# Patient Record
Sex: MALE | Race: WHITE
[De-identification: names, ages, dates, MRNs, and addresses within clinical notes are randomized per-mention and may not be internally consistent; named-entity substitution may affect disease eponyms.]

---

## 2020-08-31 ENCOUNTER — HOSPITAL ENCOUNTER (EMERGENCY)
Dept: HOSPITAL 63 - ER | Age: 50
Discharge: ANOTHER HOSPITAL | End: 2020-08-31
Payer: SELF-PAY

## 2020-08-31 VITALS — SYSTOLIC BLOOD PRESSURE: 175 MMHG | DIASTOLIC BLOOD PRESSURE: 102 MMHG

## 2020-08-31 VITALS — WEIGHT: 217.16 LBS | HEIGHT: 66 IN | BODY MASS INDEX: 34.9 KG/M2

## 2020-08-31 DIAGNOSIS — Z88.2: ICD-10-CM

## 2020-08-31 DIAGNOSIS — Z87.442: ICD-10-CM

## 2020-08-31 DIAGNOSIS — R29.810: ICD-10-CM

## 2020-08-31 DIAGNOSIS — I16.0: Primary | ICD-10-CM

## 2020-08-31 DIAGNOSIS — Z88.0: ICD-10-CM

## 2020-08-31 DIAGNOSIS — F15.20: ICD-10-CM

## 2020-08-31 DIAGNOSIS — Z88.6: ICD-10-CM

## 2020-08-31 DIAGNOSIS — F17.210: ICD-10-CM

## 2020-08-31 DIAGNOSIS — I25.2: ICD-10-CM

## 2020-08-31 DIAGNOSIS — H53.2: ICD-10-CM

## 2020-08-31 LAB
ALBUMIN SERPL-MCNC: 3.4 G/DL (ref 3.4–5)
ALBUMIN/GLOB SERPL: 0.8 {RATIO} (ref 1–1.7)
ALP SERPL-CCNC: 124 U/L (ref 46–116)
ALT SERPL-CCNC: 53 U/L (ref 16–63)
AMPHETAMINE/METHAMPHETAMINE: (no result)
ANION GAP SERPL CALC-SCNC: 8 MMOL/L (ref 6–14)
APTT PPP: YELLOW S
AST SERPL-CCNC: 39 U/L (ref 15–37)
BACTERIA #/AREA URNS HPF: 0 /HPF
BARBITURATES UR-MCNC: (no result) UG/ML
BASOPHILS # BLD AUTO: 0.1 X10^3/UL (ref 0–0.2)
BASOPHILS NFR BLD: 1 % (ref 0–3)
BENZODIAZ UR-MCNC: (no result) UG/L
BILIRUB SERPL-MCNC: 0.3 MG/DL (ref 0.2–1)
BILIRUB UR QL STRIP: (no result)
BUN/CREAT SERPL: 15 (ref 6–20)
CA-I SERPL ISE-MCNC: 17 MG/DL (ref 8–26)
CALCIUM SERPL-MCNC: 8.6 MG/DL (ref 8.5–10.1)
CANNABINOIDS UR-MCNC: (no result) UG/L
CHLORIDE SERPL-SCNC: 104 MMOL/L (ref 98–107)
CO2 SERPL-SCNC: 26 MMOL/L (ref 21–32)
COCAINE UR-MCNC: (no result) NG/ML
CREAT SERPL-MCNC: 1.1 MG/DL (ref 0.7–1.3)
EOSINOPHIL NFR BLD: 0.2 X10^3/UL (ref 0–0.7)
EOSINOPHIL NFR BLD: 3 % (ref 0–3)
ERYTHROCYTE [DISTWIDTH] IN BLOOD BY AUTOMATED COUNT: 13.8 % (ref 11.5–14.5)
FIBRINOGEN PPP-MCNC: CLEAR MG/DL
GFR SERPLBLD BASED ON 1.73 SQ M-ARVRAT: 70.6 ML/MIN
GLOBULIN SER-MCNC: 4.2 G/DL (ref 2.2–3.8)
GLUCOSE SERPL-MCNC: 128 MG/DL (ref 70–99)
GLUCOSE UR STRIP-MCNC: (no result) MG/DL
HCT VFR BLD CALC: 50.4 % (ref 39–53)
HGB BLD-MCNC: 17.4 G/DL (ref 13–17.5)
LYMPHOCYTES # BLD: 1.8 X10^3/UL (ref 1–4.8)
LYMPHOCYTES NFR BLD AUTO: 21 % (ref 24–48)
MAGNESIUM SERPL-MCNC: 2.2 MG/DL (ref 1.8–2.4)
MCH RBC QN AUTO: 31 PG (ref 25–35)
MCHC RBC AUTO-ENTMCNC: 35 G/DL (ref 31–37)
MCV RBC AUTO: 90 FL (ref 79–100)
METHADONE SERPL-MCNC: (no result) NG/ML
MONO #: 0.8 X10^3/UL (ref 0–1.1)
MONOCYTES NFR BLD: 9 % (ref 0–9)
NEUT #: 5.6 X10^3UL (ref 1.8–7.7)
NEUTROPHILS NFR BLD AUTO: 67 % (ref 31–73)
NITRITE UR QL STRIP: (no result)
OPIATES UR-MCNC: (no result) NG/ML
PCP SERPL-MCNC: (no result) MG/DL
PLATELET # BLD AUTO: 216 X10^3/UL (ref 140–400)
POTASSIUM SERPL-SCNC: 4.2 MMOL/L (ref 3.5–5.1)
PROT SERPL-MCNC: 7.6 G/DL (ref 6.4–8.2)
RBC # BLD AUTO: 5.6 X10^6/UL (ref 4.3–5.7)
RBC #/AREA URNS HPF: (no result) /HPF (ref 0–2)
SODIUM SERPL-SCNC: 138 MMOL/L (ref 136–145)
SP GR UR STRIP: 1.02
SQUAMOUS #/AREA URNS LPF: (no result) /LPF
UROBILINOGEN UR-MCNC: 0.2 MG/DL
WBC # BLD AUTO: 8.5 X10^3/UL (ref 4–11)
WBC #/AREA URNS HPF: (no result) /HPF (ref 0–4)

## 2020-08-31 PROCEDURE — 84484 ASSAY OF TROPONIN QUANT: CPT

## 2020-08-31 PROCEDURE — 80053 COMPREHEN METABOLIC PANEL: CPT

## 2020-08-31 PROCEDURE — 85730 THROMBOPLASTIN TIME PARTIAL: CPT

## 2020-08-31 PROCEDURE — 81001 URINALYSIS AUTO W/SCOPE: CPT

## 2020-08-31 PROCEDURE — 96374 THER/PROPH/DIAG INJ IV PUSH: CPT

## 2020-08-31 PROCEDURE — 99285 EMERGENCY DEPT VISIT HI MDM: CPT

## 2020-08-31 PROCEDURE — 80307 DRUG TEST PRSMV CHEM ANLYZR: CPT

## 2020-08-31 PROCEDURE — 36415 COLL VENOUS BLD VENIPUNCTURE: CPT

## 2020-08-31 PROCEDURE — 83880 ASSAY OF NATRIURETIC PEPTIDE: CPT

## 2020-08-31 PROCEDURE — 93005 ELECTROCARDIOGRAM TRACING: CPT

## 2020-08-31 PROCEDURE — 85610 PROTHROMBIN TIME: CPT

## 2020-08-31 PROCEDURE — 85025 COMPLETE CBC W/AUTO DIFF WBC: CPT

## 2020-08-31 PROCEDURE — 70450 CT HEAD/BRAIN W/O DYE: CPT

## 2020-08-31 PROCEDURE — 83735 ASSAY OF MAGNESIUM: CPT
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
## 2020-08-31 NOTE — RAD
CT CODE STROKE HEAD WO

 

History:Slurred speech, altered mental status, hypertensive

 

Comparison: None.

 

Technique: Noncontrast CT imaging was performed of the head.  Exposure: 

One or more of the following individualized dose reduction techniques were

utilized for this examination:  1. Automated exposure control  2. 

Adjustment of the mA and/or kV according to patient size  3. Use of 

iterative reconstruction technique.

 

Findings: No acute intracranial hemorrhage is identified. Right transverse

and sigmoid sinuses are somewhat dense in appearance. There is no midline 

shift. There is an approximate 8 mm focus of low density of the genu of 

the left internal capsule extending to the anterior margin of the left 

thalamus. Ventricles are normal in size. The visualized paranasal sinuses 

and mastoid air cells are aerated.

 

Impression:

1. No acute intracranial hemorrhage is identified.

2. There is a focus of lower density at the margin of the left basal 

ganglia and thalamus which may be secondary to at least subacute infarct 

although chronicity more accurately evaluated by MRI as per clinical 

indication.

3. There is somewhat dense appearance of the venous sinuses, could be due 

to hemoconcentration" unless there is clinical suspicion for venous 

thrombosis.

 

Critical results were discussed with Dr. Gracia at 8/31/2020 9:19 AM.

 

 

 

Electronically signed by: Herb Watts MD (8/31/2020 9:21 AM) [id number]

## 2020-08-31 NOTE — EKG
[facility] [address], [place] [address]

Test Date:    2020               Test Time:    09:30:22

Pat Name:     PANKAJ HART                Department:   

Patient ID:   [id number]           Room:          

Gender:       M                        Technician:   

:          1970               Requested By: SHANKAR TOLEDO

Order Number: [id number]            Reading MD:     

                                 Measurements

Intervals                              Axis          

Rate:         81                       P:            24

NH:           174                      QRS:          -10

QRSD:         72                       T:            3

QT:           384                                    

QTc:          447                                    

                           Interpretive Statements

SINUS RHYTHM

LEFTWARD AXIS

QRS(T) CONTOUR ABNORMALITY

CONSISTENT WITH INFERIOR INFARCT

AGE UNDETERMINED

ABNORMAL ECG

RI6.02

No previous ECG available for comparison

## 2020-10-19 ENCOUNTER — HOSPITAL ENCOUNTER (INPATIENT)
Dept: HOSPITAL 63 - ER | Age: 50
LOS: 3 days | Discharge: HOME | DRG: 305 | End: 2020-10-22
Attending: INTERNAL MEDICINE | Admitting: HOSPITALIST
Payer: SELF-PAY

## 2020-10-19 VITALS — DIASTOLIC BLOOD PRESSURE: 98 MMHG | SYSTOLIC BLOOD PRESSURE: 140 MMHG

## 2020-10-19 VITALS — SYSTOLIC BLOOD PRESSURE: 132 MMHG | DIASTOLIC BLOOD PRESSURE: 99 MMHG

## 2020-10-19 VITALS — DIASTOLIC BLOOD PRESSURE: 104 MMHG | SYSTOLIC BLOOD PRESSURE: 146 MMHG

## 2020-10-19 VITALS — WEIGHT: 203.71 LBS | HEIGHT: 67 IN | BODY MASS INDEX: 31.97 KG/M2

## 2020-10-19 VITALS — DIASTOLIC BLOOD PRESSURE: 100 MMHG | SYSTOLIC BLOOD PRESSURE: 145 MMHG

## 2020-10-19 VITALS — SYSTOLIC BLOOD PRESSURE: 155 MMHG | DIASTOLIC BLOOD PRESSURE: 103 MMHG

## 2020-10-19 VITALS — DIASTOLIC BLOOD PRESSURE: 99 MMHG | SYSTOLIC BLOOD PRESSURE: 144 MMHG

## 2020-10-19 DIAGNOSIS — Z87.442: ICD-10-CM

## 2020-10-19 DIAGNOSIS — Z80.0: ICD-10-CM

## 2020-10-19 DIAGNOSIS — Z85.828: ICD-10-CM

## 2020-10-19 DIAGNOSIS — I25.10: ICD-10-CM

## 2020-10-19 DIAGNOSIS — R63.3: ICD-10-CM

## 2020-10-19 DIAGNOSIS — N20.0: ICD-10-CM

## 2020-10-19 DIAGNOSIS — I16.1: Primary | ICD-10-CM

## 2020-10-19 DIAGNOSIS — I25.2: ICD-10-CM

## 2020-10-19 DIAGNOSIS — R29.705: ICD-10-CM

## 2020-10-19 DIAGNOSIS — Z86.73: ICD-10-CM

## 2020-10-19 DIAGNOSIS — R29.810: ICD-10-CM

## 2020-10-19 DIAGNOSIS — I10: ICD-10-CM

## 2020-10-19 DIAGNOSIS — Z88.0: ICD-10-CM

## 2020-10-19 DIAGNOSIS — E11.9: ICD-10-CM

## 2020-10-19 DIAGNOSIS — Z82.49: ICD-10-CM

## 2020-10-19 DIAGNOSIS — G83.9: ICD-10-CM

## 2020-10-19 DIAGNOSIS — J44.9: ICD-10-CM

## 2020-10-19 DIAGNOSIS — F17.200: ICD-10-CM

## 2020-10-19 DIAGNOSIS — E78.5: ICD-10-CM

## 2020-10-19 DIAGNOSIS — Z91.14: ICD-10-CM

## 2020-10-19 DIAGNOSIS — K21.9: ICD-10-CM

## 2020-10-19 DIAGNOSIS — E78.00: ICD-10-CM

## 2020-10-19 LAB
ALBUMIN SERPL-MCNC: 3.3 G/DL (ref 3.4–5)
ALBUMIN/GLOB SERPL: 0.9 {RATIO} (ref 1–1.7)
ALP SERPL-CCNC: 137 U/L (ref 46–116)
ALT SERPL-CCNC: 132 U/L (ref 16–63)
AMPHETAMINE/METHAMPHETAMINE: (no result)
ANION GAP SERPL CALC-SCNC: 7 MMOL/L (ref 6–14)
APTT PPP: YELLOW S
AST SERPL-CCNC: 67 U/L (ref 15–37)
BACTERIA #/AREA URNS HPF: 0 /HPF
BARBITURATES UR-MCNC: (no result) UG/ML
BASOPHILS # BLD AUTO: 0.1 X10^3/UL (ref 0–0.2)
BASOPHILS NFR BLD: 1 % (ref 0–3)
BENZODIAZ UR-MCNC: (no result) UG/L
BILIRUB SERPL-MCNC: 0.3 MG/DL (ref 0.2–1)
BILIRUB UR QL STRIP: (no result)
BUN/CREAT SERPL: 15 (ref 6–20)
CA-I SERPL ISE-MCNC: 17 MG/DL (ref 8–26)
CALCIUM SERPL-MCNC: 9.4 MG/DL (ref 8.5–10.1)
CANNABINOIDS UR-MCNC: (no result) UG/L
CHLORIDE SERPL-SCNC: 104 MMOL/L (ref 98–107)
CO2 SERPL-SCNC: 27 MMOL/L (ref 21–32)
COCAINE UR-MCNC: (no result) NG/ML
CREAT SERPL-MCNC: 1.1 MG/DL (ref 0.7–1.3)
EOSINOPHIL NFR BLD: 0.2 X10^3/UL (ref 0–0.7)
EOSINOPHIL NFR BLD: 1 % (ref 0–3)
ERYTHROCYTE [DISTWIDTH] IN BLOOD BY AUTOMATED COUNT: 13.4 % (ref 11.5–14.5)
FIBRINOGEN PPP-MCNC: CLEAR MG/DL
GFR SERPLBLD BASED ON 1.73 SQ M-ARVRAT: 70.6 ML/MIN
GLOBULIN SER-MCNC: 3.8 G/DL (ref 2.2–3.8)
GLUCOSE SERPL-MCNC: 132 MG/DL (ref 70–99)
GLUCOSE UR STRIP-MCNC: (no result) MG/DL
HCT VFR BLD CALC: 46.4 % (ref 39–53)
HGB BLD-MCNC: 15.7 G/DL (ref 13–17.5)
LYMPHOCYTES # BLD: 2.7 X10^3/UL (ref 1–4.8)
LYMPHOCYTES NFR BLD AUTO: 24 % (ref 24–48)
MCH RBC QN AUTO: 30 PG (ref 25–35)
MCHC RBC AUTO-ENTMCNC: 34 G/DL (ref 31–37)
MCV RBC AUTO: 89 FL (ref 79–100)
METHADONE SERPL-MCNC: (no result) NG/ML
MONO #: 1.2 X10^3/UL (ref 0–1.1)
MONOCYTES NFR BLD: 10 % (ref 0–9)
NEUT #: 7.3 X10^3UL (ref 1.8–7.7)
NEUTROPHILS NFR BLD AUTO: 64 % (ref 31–73)
NITRITE UR QL STRIP: (no result)
OPIATES UR-MCNC: (no result) NG/ML
PCP SERPL-MCNC: (no result) MG/DL
PLATELET # BLD AUTO: 190 X10^3/UL (ref 140–400)
POTASSIUM SERPL-SCNC: 3.9 MMOL/L (ref 3.5–5.1)
PROT SERPL-MCNC: 7.1 G/DL (ref 6.4–8.2)
RBC # BLD AUTO: 5.21 X10^6/UL (ref 4.3–5.7)
RBC #/AREA URNS HPF: 0 /HPF (ref 0–2)
SODIUM SERPL-SCNC: 138 MMOL/L (ref 136–145)
SP GR UR STRIP: 1.02
SQUAMOUS #/AREA URNS LPF: (no result) /LPF
UROBILINOGEN UR-MCNC: 0.2 MG/DL
WBC # BLD AUTO: 11.4 X10^3/UL (ref 4–11)
WBC #/AREA URNS HPF: 0 /HPF (ref 0–4)

## 2020-10-19 PROCEDURE — 96365 THER/PROPH/DIAG IV INF INIT: CPT

## 2020-10-19 PROCEDURE — 93005 ELECTROCARDIOGRAM TRACING: CPT

## 2020-10-19 PROCEDURE — 71045 X-RAY EXAM CHEST 1 VIEW: CPT

## 2020-10-19 PROCEDURE — 90471 IMMUNIZATION ADMIN: CPT

## 2020-10-19 PROCEDURE — 74176 CT ABD & PELVIS W/O CONTRAST: CPT

## 2020-10-19 PROCEDURE — 82947 ASSAY GLUCOSE BLOOD QUANT: CPT

## 2020-10-19 PROCEDURE — 80061 LIPID PANEL: CPT

## 2020-10-19 PROCEDURE — 84443 ASSAY THYROID STIM HORMONE: CPT

## 2020-10-19 PROCEDURE — 76770 US EXAM ABDO BACK WALL COMP: CPT

## 2020-10-19 PROCEDURE — 70450 CT HEAD/BRAIN W/O DYE: CPT

## 2020-10-19 PROCEDURE — 96366 THER/PROPH/DIAG IV INF ADDON: CPT

## 2020-10-19 PROCEDURE — 80048 BASIC METABOLIC PNL TOTAL CA: CPT

## 2020-10-19 PROCEDURE — 36415 COLL VENOUS BLD VENIPUNCTURE: CPT

## 2020-10-19 PROCEDURE — 80053 COMPREHEN METABOLIC PANEL: CPT

## 2020-10-19 PROCEDURE — 81001 URINALYSIS AUTO W/SCOPE: CPT

## 2020-10-19 PROCEDURE — 80307 DRUG TEST PRSMV CHEM ANLYZR: CPT

## 2020-10-19 PROCEDURE — 84484 ASSAY OF TROPONIN QUANT: CPT

## 2020-10-19 PROCEDURE — 85025 COMPLETE CBC W/AUTO DIFF WBC: CPT

## 2020-10-19 RX ADMIN — MORPHINE SULFATE PRN MG: 2 INJECTION, SOLUTION INTRAMUSCULAR; INTRAVENOUS at 21:58

## 2020-10-19 NOTE — NUR
Pt's O2 desats into low 80's while asleep with periods of apnea noted. Pt roused and reports 
he has been told he has sleep apnea in the past, but does not use a CPAP at home. O2 applied 
at 2L via NC to keep sats >90%.

## 2020-10-19 NOTE — RAD
Exam: Chest one view

 

INDICATION: Altered mental status

 

TECHNIQUE: Frontal view of the chest

 

Comparisons: None

 

FINDINGS:

The cardiomediastinal silhouette and pulmonary vessels are within normal 

limits.

 

The lung and pleural spaces are clear.

 

IMPRESSION:

No acute cardiopulmonary process.

 

Electronically signed by: Kim Burton MD (10/19/2020 6:11 PM) DANIELLA

## 2020-10-19 NOTE — NUR
ADMISSION:



The patient, PANKAJ HART, 49 y/o, M admitted by CASSI REYES MD, was given written 
information regarding hospital policies, unit procedures and contact persons.  Pt arrived to 
ICU bed 5 via rney, accompanied by [facility] and nursing sup. Pt able to amb independently 
from gurney to toilet to void. Steady gait noted. Pt A/Ox4, slurred speech noted. Pt reports 
recent hx of CVA, discharged from [facility] on 9/5/20. Pt here for hypertensive emergency. BP 
212/129 upon arrival to ED, POV. Pt started on Cardene gtt with improvement, gtt currently 
on hold as BP is now 146/104. Telemetry placed, showing SR with rate in the low 90's. Pt c/o 
continued posterior HA and wishing to eat. Dr. Reyes notified of pt arrival and status. Home 
meds reviewed and continued. New order received to advance diet as tolerated and give 
morphine 2mg IVP prn q1hr for pain. Pt on Plavix and ASA for VTE. POC discussed, pt V/U. 
Call light in reach. 



Valuables were checked and logged. Left in room with patient.

## 2020-10-19 NOTE — PHYS DOC
Past History


Past Medical History:  CVA, Hypertension, Kidney Stones, MI, Sinusitis


Past Surgical History:  Other


Additional Past Surgical Histo:  SINUS SURGERY; STENT FOR KIDNEY STONE; KIDNEY 

STONES BLASTED


Smoking:  Cigarettes


Alcohol Use:  None


Drug Use:  Amphetamine





Adult General


Chief Complaint


Chief Complaint:  NEURO SYMPTOMS/DEFICITS





HPI


HPI





Patient is a 50-year-old male who presents for right sided facial droop/

paralysis.  This was first noticed yesterday morning greater than 24 hours ago 

without any known inciting event or trauma.  Patient reports inability to fully 

move right side of mouth and has had difficulties with eating and has slurred 

speech.  Problem persisted until he finally came to our ER today for evaluation.

 Patient has extensive past medical history significant for CAD, h

ypercholesterolemia, diabetes, hypertension, and prior left pontine stroke that 

was treated at [facility] 8/31/2020-9/5/2020.  He has been taking all medications 

daily and is currently on Plavix.  He has not been checking his blood pressure 

daily and unknown what his blood pressure averages whilst taking Cozaar.  

Patient admits continued cigarette use, denies any excessive drinking or other 

concerning risk factors at this time





On arrival, code stroke was initiated even though patient was outside window for

TPA with symptom onset greater than 24 hours ago.  NIH stroke scale was 

calculated at 5, please defer to nurse documentation for specifics regarding 

this





Review of Systems


Review of Systems


Fourteen body systems of review of systems have been reviewed. See HPI for 

pertinent positives and negative responses, other wise all other systems are 

negative, non-pertinent or non-contributory





Current Medications


Current Medications





Current Medications








 Medications


  (Trade)  Dose


 Ordered  Sig/Jeffery  Start Time


 Stop Time Status Last Admin


Dose Admin


 


 Nicardipine HCl


 50 mg/Sodium


 Chloride  270 ml @ 


 27 mls/hr  CONT  PRN  10/19/20 18:00


    10/19/20 18:17


27 MLS/HR











Allergies


Allergies





Allergies








Coded Allergies Type Severity Reaction Last Updated Verified


 


  Penicillins Allergy Unknown  8/31/20 Yes


 


  Sulfa (Sulfonamide Antibiotics) Allergy Unknown  8/31/20 Yes


 


  aspirin Allergy Unknown  8/31/20 Yes











Physical Exam


Physical Exam


Constitutional: Well developed, well nourished, unkept, poor hygiene, no acute 

distress, non-toxic appearance. 


HENT: Normocephalic, atraumatic, bilateral external ears normal, oropharynx 

moist, no oral exudates, nose normal. 


Eyes: PERRLA, EOMI, conjunctiva normal, no discharge.  


Neck: Normal range of motion, no tenderness, supple, no stridor.  


Cardiovascular: Heart rate regular, sinus rhythm, no murmurs rubs or gallops


Lungs & Thorax:  Bilateral breath sounds clear to auscultation 


Abdomen: Bowel sounds normal, soft, protuberant abdomen, no tenderness, no 

masses, no pulsatile masses.  Nonsurgical abdomen, no peritoneal signs


Skin: Warm, dry, no erythema, no rash.  


Back: No tenderness, no CVA tenderness.  


Extremities: No tenderness, no cyanosis, no clubbing, ROM intact, no edema.  


Neurologic: Alert and oriented X 3, unremarkable sensory function, impaired 

motor function to right lower portion of face with obvious facial droop and 

slurred speech, cranial nerves II through XII intact except mild decreased 

sensation of right cranial nerve V V2 and V3 dermatomes, no other obvious focal 

deficits noted. 


Psychologic: Affect normal, judgement normal, mood normal.





Current Patient Data


Vital Signs





                                   Vital Signs








  Date Time  Temp Pulse Resp B/P (MAP) Pulse Ox O2 Delivery O2 Flow Rate FiO2


 


10/19/20 17:43 98.4 96 18 212/129 (156) 94 Room Air  








Lab Results





                                Laboratory Tests








Test


 10/19/20


17:45 10/19/20


17:49


 


White Blood Count


 11.4 x10^3/uL


(4.0-11.0)  H 





 


Red Blood Count


 5.21 x10^6/uL


(4.30-5.70) 





 


Hemoglobin


 15.7 g/dL


(13.0-17.5) 





 


Hematocrit


 46.4 %


(39.0-53.0) 





 


Mean Corpuscular Volume


 89 fL ()


 





 


Mean Corpuscular Hemoglobin 30 pg (25-35)   


 


Mean Corpuscular Hemoglobin


Concent 34 g/dL


(31-37) 





 


Red Cell Distribution Width


 13.4 %


(11.5-14.5) 





 


Platelet Count


 190 x10^3/uL


(140-400) 





 


Neutrophils (%) (Auto) 64 % (31-73)   


 


Lymphocytes (%) (Auto) 24 % (24-48)   


 


Monocytes (%) (Auto) 10 % (0-9)  H 


 


Eosinophils (%) (Auto) 1 % (0-3)   


 


Basophils (%) (Auto) 1 % (0-3)   


 


Neutrophils # (Auto)


 7.3 x10^3uL


(1.8-7.7) 





 


Lymphocytes # (Auto)


 2.7 x10^3/uL


(1.0-4.8) 





 


Monocytes # (Auto)


 1.2 x10^3/uL


(0.0-1.1)  H 





 


Eosinophils # (Auto)


 0.2 x10^3/uL


(0.0-0.7) 





 


Basophils # (Auto)


 0.1 x10^3/uL


(0.0-0.2) 





 


Sodium Level


 138 mmol/L


(136-145) 





 


Potassium Level


 3.9 mmol/L


(3.5-5.1) 





 


Chloride Level


 104 mmol/L


() 





 


Carbon Dioxide Level


 27 mmol/L


(21-32) 





 


Anion Gap 7 (6-14)   


 


Blood Urea Nitrogen


 17 mg/dL


(8-26) 





 


Creatinine


 1.1 mg/dL


(0.7-1.3) 





 


Estimated GFR


(Cockcroft-Gault) 70.6  


 





 


BUN/Creatinine Ratio 15 (6-20)   


 


Glucose Level


 132 mg/dL


(70-99)  H 





 


Calcium Level


 9.4 mg/dL


(8.5-10.1) 





 


Total Bilirubin


 0.3 mg/dL


(0.2-1.0) 





 


Aspartate Amino Transferase


(AST) 67 U/L (15-37)


H 





 


Alanine Aminotransferase (ALT)


 132 U/L


(16-63)  H 





 


Alkaline Phosphatase


 137 U/L


()  H 





 


Total Protein


 7.1 g/dL


(6.4-8.2) 





 


Albumin


 3.3 g/dL


(3.4-5.0)  L 





 


Albumin/Globulin Ratio


 0.9 (1.0-1.7)


L 





 


Glucose (Fingerstick)


 


 131 mg/dL


(70-99)  H











EKG


EKG


EKG ordered and interpreted by off going physician Dr. Rain at 1750 and 

confirmed by myself at 1823 as sinus rhythm at 89 bpm, unremarkable intervals, 

left axis deviation, no acute ischemic findings, no STEMI





Radiology/Procedures


Radiology/Procedures





PROCEDURE: CT HEAD WO CONTRAST





Exam: CT head


 


INDICATION: Stroke


 


TECHNIQUE: Sequential axial images through the head were obtained without 


the administration of IV contrast.


 


Comparisons: 8/31/2020


 


FINDINGS:


No focal parenchymal lesion or hemorrhage is identified. There is no 


midline shift or sulcal effacement.


 


Stable lacunar infarct at the left basal ganglia. No acute vascular 


territory infarction is identified. Gray-white distinction is preserved.


 


The ventricular system is within normal limits without compression 


hydrocephalus. The basal cisterns are well maintained.


 


The visualized portions of the paranasal sinuses and mastoid air cells are


well-pneumatized. No acute fractures.


 


IMPRESSION:


No acute intracranial abnormality. No acute hemorrhage.


 


Exposure: One or more of the following in the visualized dose reduction 


techniques were utilized for this examination:


1.  Automated exposure control


2.  Adjustment of the MA and/or KV according to patient size


Use of iterative of reconstructive technique


 

















PROCEDURE: CHEST AP ONLY





Exam: Chest one view


 


INDICATION: Altered mental status


 


TECHNIQUE: Frontal view of the chest


 


Comparisons: None


 


FINDINGS:


The cardiomediastinal silhouette and pulmonary vessels are within normal 


limits.


 


The lung and pleural spaces are clear.


 


IMPRESSION:


No acute cardiopulmonary process.


 


Electronically signed by: Kim Burton MD (10/19/2020 6:11 PM) [facility]





Heart Score


HEART Score for Chest Pain:  








HEART Score for Chest Pain Response (Comments) Value


 


History Slighlty/Non-Suspicious 0


 


ECG Normal 0


 


Age >45 - < 65 1


 


Risk Factors >3 Risk Factors or Hx CAD 2


 


Troponin < Normal Limit 0


 


Total  3








Risk Factors:


Risk Factors:  DM, Current or recent (<one month) smoker, HTN, HLP, family 

history of CAD, obesity.


Risk Scores:


Risk Factors:  DM, Current or recent (<one month) smoker, HTN, HLP, family 

history of CAD, obesity.





Course & Med Decision Making


Course & Med Decision Making


Comprehensive signout obtained from off going physician who ordered preliminary 

work-up studies


I observed and assisted team in obtaining NIH stroke scale


Airway patent, breathing unremarkable, vitals concerning for marked hypertension

with obvious endorgan damage, IV access obtained


Comprehensive history and physical exam obtained with subsequent diagnostic 

work-up reviewed and added to as indicated


IV nicardipine administered with significant relief in patient's symptomology 

and subsequent blood pressure reading


Discussed with patient most likely diagnosis of hypertensive emergency, I 

discussed need for continued inpatient management of condition in ICU setting 

for which she was amenable


Dr. Reyes was contacted and case discussed, he was agreeable to admission to [facility] in ICU setting for continued blood pressure control and medical 

management of patient's condition.  He will continue on IV nicardipine ggt with 

sbp 140-160


Patient will likely have a case reviewed with neurologist tomorrow morning


All questions and concerns discussed with with patient prior to transport to [facility] for further medical intervention





Dragon Disclaimer


Dragon Disclaimer


This electronic medical record was generated, in whole or in part, using a voice

recognition dictation system.





Departure


Departure:


Impression:  


   Primary Impression:  


   Hypertensive emergency


Disposition:  09 ADMITTED AS INPT THIS HOSP ([facility])


Admitting Physician:  Vijay Reyes


Condition:  STABLE


Referrals:  


PCP,NO (PCP)











JAZMYN LOVE DO                 Oct 19, 2020 18:25

## 2020-10-19 NOTE — RAD
Exam: CT head

 

INDICATION: Stroke

 

TECHNIQUE: Sequential axial images through the head were obtained without 

the administration of IV contrast.

 

Comparisons: 8/31/2020

 

FINDINGS:

No focal parenchymal lesion or hemorrhage is identified. There is no 

midline shift or sulcal effacement.

 

Stable lacunar infarct at the left basal ganglia. No acute vascular 

territory infarction is identified. Gray-white distinction is preserved.

 

The ventricular system is within normal limits without compression 

hydrocephalus. The basal cisterns are well maintained.

 

The visualized portions of the paranasal sinuses and mastoid air cells are

well-pneumatized. No acute fractures.

 

IMPRESSION:

No acute intracranial abnormality. No acute hemorrhage.

 

Exposure: One or more of the following in the visualized dose reduction 

techniques were utilized for this examination:

1.  Automated exposure control

2.  Adjustment of the MA and/or KV according to patient size

Use of iterative of reconstructive technique

 

 

**********FOR INTERNAL CODING PURPOSES**********

 

Critical result:

 

Findings discussed with  Dr. Boyle at 10/19/2020 6:18 PM.

 

RESULT CODE: (C)  

 

 

 

 

 

Electronically signed by: Kim Burton MD (10/19/2020 6:25 PM) [facility]

## 2020-10-19 NOTE — EKG
[facility] [address], [place] [address]

Test Date:    2020-10-19               Test Time:    17:46:24

Pat Name:     PANKAJ HART                Department:   

Patient ID:   [id number]           Room:          

Gender:       M                        Technician:   AUNG

:          1970               Requested By: NICOLA WEST

Order Number: [id number]            Reading MD:     

                                 Measurements

Intervals                              Axis          

Rate:         89                       P:            42

IL:           160                      QRS:          -15

QRSD:         72                       T:            9

QT:           350                                    

QTc:          427                                    

                           Interpretive Statements

SINUS RHYTHM

LEFTWARD AXIS

QRS(T) CONTOUR ABNORMALITY

CONSISTENT WITH INFERIOR INFARCT

PROBABLY OLD

ABNORMAL ECG

RI6.02

No previous ECG available for comparison

## 2020-10-20 VITALS — SYSTOLIC BLOOD PRESSURE: 126 MMHG | DIASTOLIC BLOOD PRESSURE: 96 MMHG

## 2020-10-20 VITALS — SYSTOLIC BLOOD PRESSURE: 152 MMHG | DIASTOLIC BLOOD PRESSURE: 100 MMHG

## 2020-10-20 VITALS — DIASTOLIC BLOOD PRESSURE: 98 MMHG | SYSTOLIC BLOOD PRESSURE: 140 MMHG

## 2020-10-20 VITALS — SYSTOLIC BLOOD PRESSURE: 128 MMHG | DIASTOLIC BLOOD PRESSURE: 91 MMHG

## 2020-10-20 VITALS — SYSTOLIC BLOOD PRESSURE: 138 MMHG | DIASTOLIC BLOOD PRESSURE: 93 MMHG

## 2020-10-20 VITALS — SYSTOLIC BLOOD PRESSURE: 123 MMHG | DIASTOLIC BLOOD PRESSURE: 81 MMHG

## 2020-10-20 VITALS — SYSTOLIC BLOOD PRESSURE: 152 MMHG | DIASTOLIC BLOOD PRESSURE: 105 MMHG

## 2020-10-20 VITALS — SYSTOLIC BLOOD PRESSURE: 127 MMHG | DIASTOLIC BLOOD PRESSURE: 93 MMHG

## 2020-10-20 VITALS — SYSTOLIC BLOOD PRESSURE: 153 MMHG | DIASTOLIC BLOOD PRESSURE: 104 MMHG

## 2020-10-20 VITALS — DIASTOLIC BLOOD PRESSURE: 95 MMHG | SYSTOLIC BLOOD PRESSURE: 144 MMHG

## 2020-10-20 VITALS — SYSTOLIC BLOOD PRESSURE: 140 MMHG | DIASTOLIC BLOOD PRESSURE: 101 MMHG

## 2020-10-20 LAB
ANION GAP SERPL CALC-SCNC: 11 MMOL/L (ref 6–14)
BASOPHILS # BLD AUTO: 0.1 X10^3/UL (ref 0–0.2)
BASOPHILS NFR BLD: 1 % (ref 0–3)
CA-I SERPL ISE-MCNC: 17 MG/DL (ref 8–26)
CALCIUM SERPL-MCNC: 9.1 MG/DL (ref 8.5–10.1)
CHLORIDE SERPL-SCNC: 104 MMOL/L (ref 98–107)
CHOLEST/HDLC SERPL: 3 {RATIO}
CO2 SERPL-SCNC: 24 MMOL/L (ref 21–32)
CREAT SERPL-MCNC: 1 MG/DL (ref 0.7–1.3)
EOSINOPHIL NFR BLD: 0.2 X10^3/UL (ref 0–0.7)
EOSINOPHIL NFR BLD: 2 % (ref 0–3)
ERYTHROCYTE [DISTWIDTH] IN BLOOD BY AUTOMATED COUNT: 13.2 % (ref 11.5–14.5)
GFR SERPLBLD BASED ON 1.73 SQ M-ARVRAT: 78.8 ML/MIN
GLUCOSE SERPL-MCNC: 107 MG/DL (ref 70–99)
HCT VFR BLD CALC: 44.2 % (ref 39–53)
HDLC SERPL-MCNC: 39 MG/DL (ref 40–60)
HGB BLD-MCNC: 15.3 G/DL (ref 13–17.5)
LDLC: 64 MG/DL (ref 0–100)
LYMPHOCYTES # BLD: 1.8 X10^3/UL (ref 1–4.8)
LYMPHOCYTES NFR BLD AUTO: 19 % (ref 24–48)
MCH RBC QN AUTO: 31 PG (ref 25–35)
MCHC RBC AUTO-ENTMCNC: 35 G/DL (ref 31–37)
MCV RBC AUTO: 89 FL (ref 79–100)
MONO #: 0.8 X10^3/UL (ref 0–1.1)
MONOCYTES NFR BLD: 9 % (ref 0–9)
NEUT #: 6.6 X10^3UL (ref 1.8–7.7)
NEUTROPHILS NFR BLD AUTO: 70 % (ref 31–73)
PLATELET # BLD AUTO: 195 X10^3/UL (ref 140–400)
POTASSIUM SERPL-SCNC: 4.1 MMOL/L (ref 3.5–5.1)
RBC # BLD AUTO: 4.98 X10^6/UL (ref 4.3–5.7)
SODIUM SERPL-SCNC: 139 MMOL/L (ref 136–145)
TRIGL SERPL-MCNC: 71 MG/DL (ref 0–150)
VLDLC: 14 MG/DL (ref 0–40)
WBC # BLD AUTO: 9.4 X10^3/UL (ref 4–11)

## 2020-10-20 RX ADMIN — ASPIRIN SCH MG: 81 TABLET, COATED ORAL at 07:44

## 2020-10-20 RX ADMIN — VITAMIN D, TAB 1000IU (100/BT) SCH UNIT: 25 TAB at 07:43

## 2020-10-20 RX ADMIN — ACETAMINOPHEN PRN MG: 500 TABLET ORAL at 19:12

## 2020-10-20 RX ADMIN — LOSARTAN POTASSIUM SCH MG: 50 TABLET, FILM COATED ORAL at 07:44

## 2020-10-20 RX ADMIN — ATORVASTATIN CALCIUM SCH MG: 20 TABLET, FILM COATED ORAL at 07:43

## 2020-10-20 RX ADMIN — PANTOPRAZOLE SODIUM SCH MG: 40 TABLET, DELAYED RELEASE ORAL at 07:44

## 2020-10-20 RX ADMIN — FOLIC ACID SCH MG: 1 TABLET ORAL at 07:44

## 2020-10-20 RX ADMIN — CLOPIDOGREL BISULFATE SCH MG: 75 TABLET ORAL at 07:43

## 2020-10-20 RX ADMIN — MORPHINE SULFATE PRN MG: 2 INJECTION, SOLUTION INTRAMUSCULAR; INTRAVENOUS at 06:11

## 2020-10-20 RX ADMIN — METOPROLOL TARTRATE SCH MG: 50 TABLET, FILM COATED ORAL at 20:13

## 2020-10-20 NOTE — CONS
DATE OF CONSULTATION:  



NEUROLOGIC CONSULTATION



REFERRING PHYSICIAN:  Dr. Reyes.



REASON FOR CONSULTATION:  Rule out stroke versus TIA.



HISTORY OF PRESENT ILLNESS:  This is a 50-year-old right-handed  male

who is known to have recent pontine stroke on 08/31/2020, was treated at [facility] when he presented with right-sided weakness.  The patient stated

that in the last 2 days, he started having numbness and paresthesia over the

right side of his face along with slurred speech.  He also complains of global

headaches without nausea or vomiting.  The patient was evaluated in the

Emergency Room and found to have emergency hypertension with blood pressure of

212/129.  The patient was alert and oriented.  Initial nonenhanced head CT scan

revealed no evidence of acute intracranial process or bleed.  The patient was

started on intravenous nicardipine drip.  Currently, he denies chest pain,

shortness of breath or palpitation, but he complains of slight dysarthria and

global headaches.  The patient was able to drink and he denies any difficulty

swallowing.  Since yesterday, the patient continues to have some slurred speech.



PAST MEDICAL HISTORY:  Significant for hypertension, stroke as described above,

hyperlipidemia, diabetes mellitus, kidney stone, coronary artery disease, status

post myocardial infarction, sinusitis, GERD, basal cell carcinoma, skin cancer.



PAST SURGICAL HISTORY:  Positive for lithotripsy and stent placement.



FAMILY HISTORY:  Positive for hypertension.



SOCIAL HISTORY:  The patient has girlfriend.  He continues to smoke.  He denies

alcohol heavy excessive alcohol drinking or illicit drug use.



CURRENT MEDICATIONS:  Nicardipine drip for emergency hypertension, morphine

sulfate, metformin, aspirin 81 mg, Plavix 75 mg daily, pantoprazole 40 mg daily,

Lipitor 80 mg daily, losartan 100 mg daily, vitamin D3 4000 units, folic acid 1

mg daily and morphine 2 mg p.r.n.



ALLERGIES:  SULFA DRUGS AND PENICILLIN.



PHYSICAL EXAMINATION:

GENERAL:  Well-developed, well-nourished male, not in acute distress.  He weighs

92.4 kilos.

VITAL SIGNS:  Blood pressure 164/102, respiratory rate 14, pulse is 77 and

regular, oxygen saturation 97% on 2 liters by nasal cannula.

HEENT:  Normocephalic, atraumatic, otherwise unremarkable.

NECK:  Supple.  Negative for carotid bruit, lymphadenopathy or thyromegaly.

LUNGS:  Clear to A and P.

CARDIOVASCULAR:  Regular rate and rhythm, normal S1, S2.

ABDOMEN:  Soft.  Bowel sounds positive.  No palpable mass, organomegaly or

tenderness.

EXTREMITIES:  Negative for cyanosis, clubbing or edema.

NEUROLOGICAL:

MENTAL STATUS:  The patient is alert and oriented x 3.  The speech is fluent,

was slightly dysarthric.  There is no language dysfunction.  Memory, judgment,

and abstract thinking are normal.  The patient denies hallucination or delusion.

CRANIAL NERVES:  Visual fields are full.  The pupils are reactive to light and

accommodation.  The extraocular movements are intact.  There is no nystagmus. 

There is very mild right facial asymmetry.  Hearing is intact bilaterally.  The

palate is elevated symmetrically.  Sternocleidomastoid muscles are powerful

bilaterally.  The patient shrugs his shoulders symmetrically with ptosis. 

Tongue in the midline without fasciculation or atrophy.

MOTOR EXAMINATION:  No focal muscle bulk was seen.  The tone is normal.  The

strength is 4/5 throughout.

SENSORY EXAMINATION:  Revealed normal pinprick and light touch senses

throughout.  Deep tendon reflexes were symmetric and hypoactive with absent

Achilles responses.

GAIT:  Not tested.



LABORATORY DATA:  CBC revealed white blood cells of 9.4 thousand, hemoglobin

15.3, hematocrit 44.2, platelet count 195,000.  Chemistry revealed sodium 139,

potassium 4.1, chloride 104, CO2 of 24, BUN 17, creatinine 1, glucose 107,

calcium 9.1.  Urinalysis is negative for urinary tract infection.  Urinary drug

screen is negative.



IMPRESSION:

1.  Emergency hypertension.

2.  Recent history of a pontine stroke and currently with mild right facial

asymmetry with mild sensory deficits confined to the right face, probably due to

emergency hypertension; however, head CT scan may not disclose any acute stroke

in the first 40-72 hours from the onset.  However, the patient does not have any

lateralization signs.

3.  Multiple medical problems include hypertension, diabetes mellitus, coronary

artery disease, and status post myocardial infarction.



RECOMMENDATIONS:

1.  Continue with current management and correct hypertension to keep the

systolic blood pressure around 140/80.

2.  Physical Therapy evaluation.





______________________________

M SIMON CASTLE MD



DR:  CARTER/ruben  JOB#:  [id number] / [id number]

DD:  10/20/2020 08:42  DT:  10/20/2020 09:01

## 2020-10-20 NOTE — HP
ADMIT DATE:  10/19/2020



ATTENDING PHYSICIAN:  Dr. Bernstein.



CHIEF COMPLAINT:  Weakness and slurred speech.



HISTORY OF PRESENT ILLNESS:  The patient is a 50-year-old gentleman admitted

with new onset right sided facial droop, weakness and paralysis.  He has had

slurred speech.  He has noted this for 24 hours now.  He reports inability to

fully move the right side of the mouth and difficulty eating with slurred

speech.  He finally came to the ED for evaluation.  He has known coronary artery

disease, diabetes, hypertension and a previous left pontine stroke, treated at

[facility] from  through  of this year.  He is questionable on

his compliance.  He is currently on Plavix.  He has no medical coverage and

sometimes he has no medicines.  He states he has been taking his medication that

has been prescribed, although he has missed doses already.  His blood pressure

is markedly high with systolic greater than 200 mmHg.  He continues to smoke. 

He denies any alcohol use.  He was admitted then on a Cardene drip and treatment

of TIA symptoms.  He had a code stroke called.  He is outside the window for TPA

as symptoms are greater than 24 hours ago.  NIH stroke scale was 5.  He had the

obligatory CT of the head, which showed no new acute stroke identified, no new

acute hemorrhage.  He was admitted for further control of blood pressure,

further evaluation and urologic consultation.



PAST MEDICAL HISTORY:  Significant for labile hypertension, continued chronic

obstructive pulmonary disease and tobacco use, kidney stones, stents and sinus

surgeries.  He is disabled, currently waiting for disability.



CURRENT MEDICINES:  Reviewed.  He was scheduled to take aspirin daily, Lipitor,

Plavix, losartan, metformin, pantoprazole and vitamin D.



ALLERGIES:  He has allergies to PENICILLIN, SULFA AND ASPIRIN, exact reaction is

unclear.



FAMILY HISTORY:  His father had prostate and colon cancer.  Mother  of

complications of neurologic issues.  He could not give further details.  He

lives with a girlfriend.  He has no coverage.  He has no income at this time. 

He is awaiting disability.



REVIEW OF SYSTEMS:  Significant for the TIA symptoms, slurred speech.  He has

been noncompliant with his meds.  He does not have a primary care physician.  He

denied any nausea or vomiting.  No recent travel or COVID exposure.  All other

systems reviewed and determined to be negative.



PHYSICAL EXAMINATION:

GENERAL:  When I saw him, this is a pleasant, middle-aged gentleman.

VITAL SIGNS:  Initial vital signs showed a blood pressure of 200 mmHg, by the

time I saw him, it was down to between 140 and 160 mmHg systolic.  He was

afebrile, oxygen saturation 96% on room air.

HEENT:  Head is without trauma.  Pupils are reactive.  Sclerae nonicteric. 

There is a slight right-sided facial droop with asymmetry of the tongue.

NECK:  Supple, no bruits identified.

LUNGS:  Otherwise clear.

CARDIOVASCULAR:  Showed regular heart tones.  No gallops.

ABDOMEN:  Soft, no guarding, no organomegaly.  Bowel sounds are hypoactive.

NEUROLOGIC:   were symmetrical and intact in upper and lower extremities.

SKIN:  Warm and dry.



PERTINENT LABORATORY STUDIES:  CT of the head as noted.  Admission hemoglobin

was 15.7 g/dL with white count of 11,400.  Chemistry panel and electrolytes

within normal range.  Creatinine 1.0 mg percent, 3 sets of cardiac enzymes

negative for myocardial ischemia.



ASSESSMENT:

1.  A 50-year-old gentleman with transient ischemic attack symptoms.

2.  Labile hypertension.

3.  Noncompliance of meds.

4.  Old left pontine stroke just 2 months ago.

5.  Chronic obstructive pulmonary disease and tobacco addiction.

6.  Essential hypertension.

7.  Type 2 diabetes.



PLAN:

1.  Admit to the inpatient unit.

2.  Cardene drip has been initiated.

3.  Neurology consultation in the morning.

4.  Continue home meds.

5.  PT, OT and speech consult.





______________________________

CASSI BERNSTEIN MD



DR:  DEBBIE/ruben  JOB#:  [id number] / [id number]

DD:  10/20/2020 09:01  DT:  10/20/2020 09:37

## 2020-10-21 VITALS — SYSTOLIC BLOOD PRESSURE: 180 MMHG | DIASTOLIC BLOOD PRESSURE: 109 MMHG

## 2020-10-21 VITALS — SYSTOLIC BLOOD PRESSURE: 155 MMHG | DIASTOLIC BLOOD PRESSURE: 101 MMHG

## 2020-10-21 VITALS — SYSTOLIC BLOOD PRESSURE: 134 MMHG | DIASTOLIC BLOOD PRESSURE: 88 MMHG

## 2020-10-21 VITALS — DIASTOLIC BLOOD PRESSURE: 81 MMHG | SYSTOLIC BLOOD PRESSURE: 116 MMHG

## 2020-10-21 VITALS — SYSTOLIC BLOOD PRESSURE: 164 MMHG | DIASTOLIC BLOOD PRESSURE: 127 MMHG

## 2020-10-21 VITALS — SYSTOLIC BLOOD PRESSURE: 129 MMHG | DIASTOLIC BLOOD PRESSURE: 94 MMHG

## 2020-10-21 RX ADMIN — ACETAMINOPHEN PRN MG: 500 TABLET ORAL at 20:06

## 2020-10-21 RX ADMIN — METOPROLOL TARTRATE SCH MG: 50 TABLET, FILM COATED ORAL at 20:02

## 2020-10-21 RX ADMIN — ATORVASTATIN CALCIUM SCH MG: 20 TABLET, FILM COATED ORAL at 09:00

## 2020-10-21 RX ADMIN — VITAMIN D, TAB 1000IU (100/BT) SCH UNIT: 25 TAB at 09:00

## 2020-10-21 RX ADMIN — PANTOPRAZOLE SODIUM SCH MG: 40 TABLET, DELAYED RELEASE ORAL at 09:01

## 2020-10-21 RX ADMIN — FOLIC ACID SCH MG: 1 TABLET ORAL at 09:01

## 2020-10-21 RX ADMIN — METOPROLOL TARTRATE SCH MG: 50 TABLET, FILM COATED ORAL at 09:01

## 2020-10-21 RX ADMIN — MORPHINE SULFATE PRN MG: 2 INJECTION, SOLUTION INTRAMUSCULAR; INTRAVENOUS at 18:02

## 2020-10-21 RX ADMIN — ASPIRIN SCH MG: 81 TABLET, COATED ORAL at 09:00

## 2020-10-21 RX ADMIN — LOSARTAN POTASSIUM SCH MG: 50 TABLET, FILM COATED ORAL at 09:00

## 2020-10-21 RX ADMIN — CLOPIDOGREL BISULFATE SCH MG: 75 TABLET ORAL at 09:00

## 2020-10-21 NOTE — RAD
CT Abdomen and Pelvis without contrast  

 

History: Renal stones and hydronephrosis, hypertension

 

Technique: Noncontrast CT imaging was performed of the abdomen and pelvis.

Multiplanar images are reviewed.  Exposure: One or more of the following 

individualized dose reduction techniques were utilized for this 

examination:  1. Automated exposure control  2. Adjustment of the mA 

and/or kV according to patient size  3. Use of iterative reconstruction 

technique.

 

Comparison:  September 11, 2016

 

Findings: There is no hydronephrosis of either kidney. There is remaining 

large left renal calculus about 1.4 cm, previously about 0.6 cm. There are

no right renal calculi. Previously there was another left renal calculus 

which is no longer present. No ureteral calculus is identified on either 

side.

 

There is apparently new 0.3 to 0.4 cm right lower lobe pulmonary nodule 

image 15 series 2. Accurate evaluation of abdominal visceral organs is 

limited without intravenous contrast.  There is no obvious abnormality of 

the spleen, liver, or pancreas. Gallbladder is present without obvious 

intraluminal abnormality by CT.  There is no adrenal nodularity. Accurate 

evaluation of bowel is limited without oral contrast. There is no 

significant free air, free fluid, bowel dilatation. There is mild colonic 

diverticulosis, no adjacent inflammatory change. There is some fat in the 

inguinal canals bilaterally greater on the left without internal bowel, 

somewhat increased on the left in the interval. Normal caliber appendix is

visualized without adjacent inflammatory change. There are multiple small 

nonspecific retroperitoneal nodes not considered significantly enlarged, 

overall somewhat less prominent than previously. There are some prostate 

calcifications. There is mild reverse S-shaped curvature of the 

thoracolumbar spine. There is advanced degenerative disc disease L1-L2 and

to lesser degree at L3-4 and L4-5, multilevel lumbar spondylosis.

 

Impression: 

 

1. There is large left renal calculus, no hydronephrosis of either kidney.

2. There is a new small right lower lobe pulmonary nodule near the base. 

If increased risk factors for neoplasm, dedicated chest CT to completely 

evaluate the lung parenchyma may be indicated. Regarding the visualized 

nodule, optional 12 month follow-up could be performed if increased risk 

factors for neoplasm, otherwise no additional follow-up needed if low risk

factors.

 

Electronically signed by: Herb Watts MD (10/21/2020 3:57 PM) 

[facility]

## 2020-10-21 NOTE — PN
DATE:  10/21/2020



SUBJECTIVE:  The patient is a 50-year-old  male patient who was

admitted with hypertension.  He apparently has ischemic stroke before and has

left old pontine stroke about just 2 months ago.  Obviously, his blood pressure

is poorly controlled and extremely labile.



OBJECTIVE:

GENERAL:  When I saw him this afternoon, he was resting slightly propped up in

bed, in no apparent respiratory distress, pale, no jaundice, cyanosis or

thyromegaly.  No jugular venous distention.  No lower limb edema.

VITAL SIGNS:  His heart rate was 80, blood pressure was 155/101, temperature was

97.9, respiratory rate 20, and oxygen saturation was 98% on room air.

HEAD, EYES, EARS, NOSE AND THROAT:  Normocephalic, atraumatic.

NECK:  Supple.

HEART:  Showed normal first and second heart sounds with no gallop, rub or

murmur.

CHEST:  Clear to auscultation.  No crepitation or rhonchi.

ABDOMEN:  Distended, soft, nontender.  No guarding or rigidity.  No

organomegaly.  All hernial orifice intact.  Bowel sounds normal.

NEUROLOGIC:  He is awake, alert, responding appropriately.  All cranial nerves

are intact.  He moves extremities without difficulty.



LABORATORY DATA:  As of yesterday showed a serum sodium 139, potassium 4.1,

chloride 104, bicarbonate 24, anion gap of 11, BUN 17, creatinine 1, estimated

GFR was 78 mL per minute, his glucose 107 and calcium was 9.1.  His serum

triglycerides were 71, total cholesterol 117, LDL cholesterol was 64, VLDL was

14, HDL was 39, the ratio was 3.  His white cell count was 9400, hemoglobin 15,

hematocrit 44, MCV 89 and platelet count of 195,000.  His urinalysis was

essentially unremarkable and toxic screen was negative.



PLAN:  My plan is to arrange for him to have a CT scan of the abdomen and pelvis

without contrast as well as renal __ ultrasound as he has a history of

hydronephrosis requiring stenting and has history also of congestive heart

failure that may be related to also a renal artery stenosis.





______________________________

PAVEL ALLAN MD



DR:  ANNAMARIA/ruben  JOB#:  [id number] / [id number]

DD:  10/21/2020 14:51  DT:  10/21/2020 21:22

## 2020-10-21 NOTE — NUR
PT did well today. Did take a shower. Bp was high this am then ok till about 6pm. PT started 
to get a headache and bp was high again. Pt given PRN for headache. PT had a ct today then 
will have renal ultrasound tomorrow. 



 BGray APRN

## 2020-10-22 VITALS — SYSTOLIC BLOOD PRESSURE: 131 MMHG | DIASTOLIC BLOOD PRESSURE: 70 MMHG

## 2020-10-22 VITALS — DIASTOLIC BLOOD PRESSURE: 99 MMHG | SYSTOLIC BLOOD PRESSURE: 154 MMHG

## 2020-10-22 LAB
ANION GAP SERPL CALC-SCNC: 9 MMOL/L (ref 6–14)
CA-I SERPL ISE-MCNC: 21 MG/DL (ref 8–26)
CALCIUM SERPL-MCNC: 8.8 MG/DL (ref 8.5–10.1)
CHLORIDE SERPL-SCNC: 104 MMOL/L (ref 98–107)
CO2 SERPL-SCNC: 26 MMOL/L (ref 21–32)
CREAT SERPL-MCNC: 1 MG/DL (ref 0.7–1.3)
GFR SERPLBLD BASED ON 1.73 SQ M-ARVRAT: 78.8 ML/MIN
GLUCOSE SERPL-MCNC: 107 MG/DL (ref 70–99)
POTASSIUM SERPL-SCNC: 3.9 MMOL/L (ref 3.5–5.1)
SODIUM SERPL-SCNC: 139 MMOL/L (ref 136–145)

## 2020-10-22 RX ADMIN — PANTOPRAZOLE SODIUM SCH MG: 40 TABLET, DELAYED RELEASE ORAL at 08:32

## 2020-10-22 RX ADMIN — CLOPIDOGREL BISULFATE SCH MG: 75 TABLET ORAL at 08:32

## 2020-10-22 RX ADMIN — METOPROLOL TARTRATE SCH MG: 50 TABLET, FILM COATED ORAL at 08:33

## 2020-10-22 RX ADMIN — FOLIC ACID SCH MG: 1 TABLET ORAL at 08:34

## 2020-10-22 RX ADMIN — ASPIRIN SCH MG: 81 TABLET, COATED ORAL at 08:33

## 2020-10-22 RX ADMIN — ATORVASTATIN CALCIUM SCH MG: 20 TABLET, FILM COATED ORAL at 08:32

## 2020-10-22 RX ADMIN — VITAMIN D, TAB 1000IU (100/BT) SCH UNIT: 25 TAB at 08:32

## 2020-10-22 RX ADMIN — LOSARTAN POTASSIUM SCH MG: 50 TABLET, FILM COATED ORAL at 08:33

## 2020-10-22 NOTE — NUR
Pt has been NPO since midnight for renal us scheduled for this morning.  BP improved with 
addition of metoprolol.  Pt was yelling and using foul language at one point during the 
evening while talking on the phone, asked pt to keep his voice down and not use foul 
language while in the hospital, pt apologized and agreed.

## 2020-10-22 NOTE — DS
DATE OF DISCHARGE:  10/22/2020



HOSPITAL COURSE:  The patient is a 50-year-old  male patient who was

admitted with poorly-controlled hypertension.  He was on losartan.  His blood

pressure was extremely labile.  Metoprolol 50 mg twice a day was added and his

blood pressure remained stable.  I did actually order his renal artery duplex

and it showed that there is no evidence of hemodynamically significant renal

artery stenosis.  CT scan of the abdomen and pelvis without contrast showed that

the patient has a large left renal calculus.  No hydronephrosis of either

kidney.  There is a new small right lower lobe pulmonary nodule near the base. 

With increased risk factors for neoplasm, dedicated chest CT to completely

evaluate the lung parenchyma may be indicated and in fact, his blood pressure

remained stable and well controlled and therefore, a decision was made to

discharge him home.  The patient was given a list of primary care physician to

choose from and was given a prescription for metoprolol 50 mg twice a day.



PHYSICAL EXAMINATION:

GENERAL:  When I saw him this afternoon, he looked well and was clearly in no

apparent respiratory distress, somewhat pale, but no jaundice, cyanosis or

thyromegaly.  No jugular venous distention.  No limb edema.

VITAL SIGNS:  Heart rate was 93, blood pressure was 131/70, temperature 97.9,

respiratory rate was 18 and oxygen saturation was 95%.

HEAD, EYES, EARS, NOSE AND THROAT:  Showed normocephalic, atraumatic.

NECK:  Supple.

HEART:  Showed normal first and second heart sounds.  No gallop or murmur.

CHEST:  Clear to auscultation.  No crepitation or rhonchi.

ABDOMEN:  Distended, soft, nontender.

NEUROLOGIC:  He continued to have diplopia, but otherwise all his cranial nerves

are intact.

EXTREMITIES:  He moves extremities without difficulty.



LABORATORY DATA:  This morning showed serum sodium 139, potassium 3.9, chloride

104, bicarbonate 26, anion gap of 8, BUN 21, creatinine 1, estimated GFR was 79

mL per minute.  Her glucose 107, calcium was 8.8.  Serum triglycerides were 71,

total cholesterol 117, VLDL was 64, HDL cholesterol was 39, the ratio was 3. 

His TSH was 0.980.



DISCHARGE MEDICATIONS:  He was discharged home to continue on metoprolol

tartrate 50 mg twice a day, aspirin 81 mg once a day, atorvastatin calcium 80 mg

at bedtime, Plavix 75 mg once a day, losartan potassium 100 mg once a day,

metformin 500 mg twice a day with meals, Protonix 40 mg once a day, and vitamin

D3 with Flovite 1 tablet once a day.



FINAL DISCHARGE DIAGNOSES:  Poorly controlled hypertension, much improved. 

Other medical problems include chronic obstructive pulmonary disease, continued

tobacco use.  He has also hyperlipidemia, type 2 diabetes mellitus, and

nephrolithiasis.





______________________________

PAVEL ALLAN MD



DR:  ANNAMARIA/ruben  JOB#:  [id number] / [id number]

DD:  10/22/2020 14:57  DT:  10/22/2020 17:16

## 2020-10-22 NOTE — RAD
EXAMINATION: RENAL BILAT RENAL DUPLEX CO  

 

CLINICAL HISTORY: Labile hypertension

 

TECHNIQUE: Grayscale sonographic imaging of the kidneys and urinary 

bladder obtained with color Doppler imaging and spectral Doppler analysis 

of the renal arteries.

 

COMPARISON: None 

 

 

FINDINGS:

 

Right Kidney: Measures 9.8 cm in length. Grossly unremarkable on limited 

evaluation.

- Renal Artery: Patent.

- PSV: 128 cm/s proximally, 119 cm/s mid, 82 cm/s distally

- Renal-Aortic Ratio (RAR): 1.6

- Intrarenal Resistive Index: 0.6

- Renal Vein: Patent.

 

Left Kidney: Measures 11.3 cm in length. Grossly unremarkable on limited 

evaluation.

- Renal Artery: Patent.

- PSV: 164 cm/s proximally, 68 cm/s mid, 105 cm/s distally

- Renal-Aortic Ratio (RAR): 2.0

- Intrarenal Resistive Index: up to 0.7

- Renal Vein: Patent.

 

 

IMPRESSION:

 

No evidence of hemodynamically significant renal artery stenosis.

 

Electronically signed by: Balwinder Luz DO (10/22/2020 8:40 AM) [id number]

## 2020-10-22 NOTE — PN
DATE:  10/21/2020



SUBJECTIVE:  The patient denies any new medical or neurological complaints;

however, he has had intermittent global headaches.  According to the patient,

his numbness on the right face has improved.



OBJECTIVE:

GENERAL:  A well-developed, well-nourished male, not in acute distress.

VITAL SIGNS:  Blood pressure 129/94, respiratory rate 20, pulse is 80 and

regular, oxygen saturation 98% on room air.

HEENT:  Normocephalic, atraumatic, otherwise unremarkable.

NECK:  Supple.  Negative for carotid bruit, lymphadenopathy or thyromegaly.

LUNGS:  Clear to A and P.

CARDIOVASCULAR:  Regular rate and rhythm, normal S1, S2.  There is no S3, S4 or

murmur.

ABDOMEN:  Soft.  Bowel sounds positive.

EXTREMITIES:  Negative for cyanosis, clubbing or edema.

NEUROLOGICAL EXAM:  Normal mental status and intact cranial nerves except for

intermittent double vision.  Motor examination:  No focal muscle bulk was seen. 

The strength is 4/5 throughout.  Sensory examination revealed normal pinprick

and light touch senses throughout.  Deep tendon reflexes were symmetric and

hypoactive with absent Achilles responses.  Gait not tested.



IMPRESSION:

1.  Emergency hypertension -- improved with drip.

2.  Multiple medical problems; includes hypertension, hyperlipidemia, coronary

artery disease and myocardial infarction.



RECOMMENDATIONS:

1.  Continue with current management initiated by Dr. Hernandez for management and

workup for hypertension including renal artery Dopplers.

2.  Physical therapy evaluation.





______________________________

M SIMON CASTLE MD



DR:  CARTER/ruben  JOB#:  [id number] / [id number]

DD:  10/22/2020 09:29  DT:  10/22/2020 12:16

## 2020-10-22 NOTE — NUR
Discharge: Pt a/ox4, vital signs stable, prescription given and transmitted electronically, 
all questions answered, information given regarding [facility] clinic also let pt know the 
urgent need for medicaid paperwork from .

## 2020-10-22 NOTE — PN
DATE:  



SUBJECTIVE:  The patient denies any new medical or neurological complaints.



OBJECTIVE:

GENERAL:  Well-developed, well-nourished male, not in acute distress.

VITAL SIGNS:  Blood pressure 152/99, respiratory rate 18, pulse is 72, oxygen

saturation is 95% on room air.

HEENT:  Normocephalic, atraumatic, otherwise unremarkable.

NECK:  Supple.  Negative for carotid bruit, lymphadenopathy or thyromegaly.

LUNGS:  Clear to A and P.

CARDIOVASCULAR:  Regular rate and rhythm, normal S1, S2.

ABDOMEN:  Soft.  Bowel sounds positive.

EXTREMITIES:  Negative for cyanosis, clubbing or edema.

NEUROLOGICAL EXAM:  Normal mental status and intact cranial nerves.  There are

no focal motor or sensory deficits.  Deep tendon reflexes are symmetric and

hypoactive with absent Achilles responses.  Gait not tested.



Renal artery Doppler reveal no significant no abnormalities.



IMPRESSION:

1.  Emergency hypertension -- improved.

2.  Possible transient ischemic attack, presented with intermittent numbness and

paresthesia of the left face without any significant neurological residual.

3.  Multiple medical problems include hypertension, hyperlipidemia, coronary

artery disease, and myocardial infarction.



RECOMMENDATIONS:  We will continue with current management initiated with Dr. Hernandez.  The patient is neurologically improved.





______________________________

M SIMON CASTLE MD



DR:  CARTER/ruben  JOB#:  [id number] / [id number]

DD:  10/22/2020 09:34  DT:  10/22/2020 12:16